# Patient Record
Sex: FEMALE | Race: WHITE
[De-identification: names, ages, dates, MRNs, and addresses within clinical notes are randomized per-mention and may not be internally consistent; named-entity substitution may affect disease eponyms.]

---

## 2023-08-01 ENCOUNTER — HOSPITAL ENCOUNTER (EMERGENCY)
Dept: HOSPITAL 46 - ED | Age: 78
Discharge: HOME | End: 2023-08-01
Payer: MEDICARE

## 2023-08-01 VITALS — BODY MASS INDEX: 18.05 KG/M2 | WEIGHT: 114.99 LBS | HEIGHT: 67 IN

## 2023-08-01 VITALS — DIASTOLIC BLOOD PRESSURE: 79 MMHG | SYSTOLIC BLOOD PRESSURE: 170 MMHG

## 2023-08-01 DIAGNOSIS — Z88.8: ICD-10-CM

## 2023-08-01 DIAGNOSIS — Z79.899: ICD-10-CM

## 2023-08-01 DIAGNOSIS — R55: Primary | ICD-10-CM

## 2023-08-01 NOTE — EKG
[facility]
                                    [address]  [address]
_________________________________________________________________________________________
                                                                 Signed   
 
 
Normal sinus rhythm with sinus arrhythmia
Normal ECG
No previous ECGs available
Confirmed by Humberto Adan MD ([id number]) on 8/1/2023 8:53:50 PM
 
 
 
 
 
 
 
 
 
 
 
 
 
 
 
 
 
 
 
 
 
 
 
 
 
 
 
 
 
 
 
 
 
 
 
 
 
 
 
 
 
    Electronically Signed By: HUMBERTO ADAN MD  08/01/23 2054
_________________________________________________________________________________________
PATIENT NAME:     YASIR HANSEN                    
MEDICAL RECORD #: [id number]                     Electrocardiogram             
          ACCT #: [id number]  
DATE OF BIRTH:   [birth year]                                       
PHYSICIAN:   HUMBERTO ADAN MD                 REPORT #: [id number]
REPORT IS CONFIDENTIAL AND NOT TO BE RELEASED WITHOUT AUTHORIZATION